# Patient Record
Sex: FEMALE | Race: WHITE | URBAN - METROPOLITAN AREA
[De-identification: names, ages, dates, MRNs, and addresses within clinical notes are randomized per-mention and may not be internally consistent; named-entity substitution may affect disease eponyms.]

---

## 2017-06-19 ENCOUNTER — HOSPITAL ENCOUNTER (OUTPATIENT)
Dept: MAMMOGRAPHY | Age: 67
Discharge: HOME | End: 2017-06-19
Attending: OBSTETRICS & GYNECOLOGY | Admitting: OBSTETRICS & GYNECOLOGY

## 2017-06-19 DIAGNOSIS — Z12.31 VISIT FOR SCREENING MAMMOGRAM: ICD-10-CM

## 2018-02-27 ENCOUNTER — OFFICE VISIT (OUTPATIENT)
Dept: DERMATOLOGY | Age: 68
End: 2018-02-27

## 2018-02-27 DIAGNOSIS — L82.1 SEBORRHEIC KERATOSES: ICD-10-CM

## 2018-02-27 DIAGNOSIS — D22.9 BENIGN NEVUS: Primary | ICD-10-CM

## 2018-02-27 DIAGNOSIS — L30.4 INTERTRIGO: ICD-10-CM

## 2018-02-27 DIAGNOSIS — L57.8 DIFFUSE PHOTODAMAGE OF SKIN: ICD-10-CM

## 2018-02-27 PROCEDURE — 99214 OFFICE O/P EST MOD 30 MIN: CPT | Performed by: DERMATOLOGY

## 2018-02-27 RX ORDER — NYSTATIN 100000 U/G
CREAM TOPICAL
Qty: 30 G | Refills: 3 | Status: SHIPPED | OUTPATIENT
Start: 2018-02-27 | End: 2022-02-15

## 2018-02-27 NOTE — PROGRESS NOTES
[facility]) Dermatology  Jamaica Dye M.D.  [phone]       Araseli Sanchez  1950    76 y.o. female     Date of Visit: 2/27/2018    Chief Complaint:   Chief Complaint   Patient presents with    Skin Exam     yearly exam last visit 4/5/16        I was asked to see this patient by Dr. Selby ref. provider found. History of Present Illness:  1. Total body skin exam    New problem: Recurrent rash beneath her breasts-developed in the summer when she is hot. Treats this with hydrocortisone. Has not had a prescription. Multiple seborrheic keratoses-slowly increasing in number. Asymptomatic. Predominantly located over her torso. Multiple nevi. Stable in size, shape, color. Asymptomatic. Patient has not noticed any new or changing pigmented lesions. History of actinic keratosis. No history of skin cancer    Review of Systems:  Constitutional: Reports general sense of well-being       Past Medical History, Surgical History, Family History, Medications and Allergies reviewed. Social History:   Social History     Social History    Marital status:      Spouse name: N/A    Number of children: N/A    Years of education: N/A     Occupational History    Not on file. Social History Main Topics    Smoking status: Never Smoker    Smokeless tobacco: Never Used    Alcohol use No    Drug use: Unknown    Sexual activity: Not on file     Other Topics Concern    Not on file     Social History Narrative    No narrative on file       Physical Examination       -General: Well-appearing, NAD  Normal affect. Total body skin exam including scalp, face, neck, chest, abdomen, back, bilateral upper extremities, bilateral lower extremities, ocular conjunctiva, external lips, and nails was performed. Underwear area not examined. Scattered on the trunk and extremities are multiple well-defined round and oval symmetric smoothly-bordered uniformly brown macules and papules.   Multiple

## 2020-09-08 ENCOUNTER — OFFICE VISIT (OUTPATIENT)
Dept: DERMATOLOGY | Age: 70
End: 2020-09-08
Payer: MEDICARE

## 2020-09-08 VITALS — TEMPERATURE: 97.5 F

## 2020-09-08 PROCEDURE — 99214 OFFICE O/P EST MOD 30 MIN: CPT | Performed by: DERMATOLOGY

## 2020-09-08 PROCEDURE — 17110 DESTRUCTION B9 LES UP TO 14: CPT | Performed by: DERMATOLOGY

## 2020-09-08 RX ORDER — TRIAMCINOLONE ACETONIDE 1 MG/G
CREAM TOPICAL
Qty: 80 G | Refills: 1 | Status: SHIPPED | OUTPATIENT
Start: 2020-09-08 | End: 2022-02-15

## 2020-09-08 NOTE — PROGRESS NOTES
[facility]) Dermatology  Tremaine Washington M.D.  [phone]       [facility]  1950    79 y.o. female     Date of Visit: 9/8/2020    Chief Complaint:   Chief Complaint   Patient presents with    Skin Lesion        I was asked to see this patient by Dr. Selby ref. provider found. History of Present Illness:  1. Total-body skin exam      New rash-bilateral thighs, bilateral axilla, left inguinal crease-not pruritic. Notes a textural change to her skin. Present for about the last 3 months. Not progressive. Developed rather suddenly. Raised papules upper forehead/scalp-not itching, bleeding. Asymptomatic. May be increasing in number    Raised papule right base of anterior neck-bothersome. Catching on clothing and becoming traumatized. Patient manipulating lesion. Multiple nevi. Stable in size, shape, color. No new or changing pigmented lesion    History of actinic keratosis. No history of skin cancer    Review of Systems:  Constitutional: Reports general sense of well-being   Skin-no lesions concerning for skin cancer    Past Medical History, Surgical History, Family History, Medications and Allergies reviewed.     Social History:   Social History     Socioeconomic History    Marital status:      Spouse name: Not on file    Number of children: Not on file    Years of education: Not on file    Highest education level: Not on file   Occupational History    Not on file   Social Needs    Financial resource strain: Not on file    Food insecurity     Worry: Not on file     Inability: Not on file    Transportation needs     Medical: Not on file     Non-medical: Not on file   Tobacco Use    Smoking status: Never Smoker    Smokeless tobacco: Never Used   Substance and Sexual Activity    Alcohol use: No    Drug use: Not on file    Sexual activity: Not on file   Lifestyle    Physical activity     Days per week: Not on file     Minutes per session: Not on file    Stress: Not on formation, discomfort, scar, hypopigmentation. Discussed wound care. 4. Rash-differential diagnosis includes nummular dermatitis, very early lichen sclerosis, doubt CTCL. Trial of triamcinolone 0.1% cream twice daily 2 weeks on followed by 2 weeks off-recheck 2 months. Patient will not use triamcinolone prior to that appointment. Biopsy if rash still present.   Patient may cancel if rash has resolved

## 2022-02-15 ENCOUNTER — OFFICE VISIT (OUTPATIENT)
Dept: DERMATOLOGY | Age: 72
End: 2022-02-15
Payer: MEDICARE

## 2022-02-15 DIAGNOSIS — D22.9 BENIGN NEVUS: ICD-10-CM

## 2022-02-15 DIAGNOSIS — L65.8 FEMALE PATTERN ALOPECIA: ICD-10-CM

## 2022-02-15 DIAGNOSIS — L91.8 INFLAMED SKIN TAG: ICD-10-CM

## 2022-02-15 DIAGNOSIS — L57.0 KERATOSIS, ACTINIC: ICD-10-CM

## 2022-02-15 DIAGNOSIS — R21 RASH: Primary | ICD-10-CM

## 2022-02-15 PROCEDURE — 99213 OFFICE O/P EST LOW 20 MIN: CPT | Performed by: DERMATOLOGY

## 2022-02-15 PROCEDURE — 17000 DESTRUCT PREMALG LESION: CPT | Performed by: DERMATOLOGY

## 2022-02-15 PROCEDURE — 11200 RMVL SKIN TAGS UP TO&INC 15: CPT | Performed by: DERMATOLOGY

## 2022-02-15 PROCEDURE — 11104 PUNCH BX SKIN SINGLE LESION: CPT | Performed by: DERMATOLOGY

## 2022-02-15 PROCEDURE — 17003 DESTRUCT PREMALG LES 2-14: CPT | Performed by: DERMATOLOGY

## 2022-02-15 RX ORDER — OMEPRAZOLE 40 MG/1
CAPSULE, DELAYED RELEASE ORAL
COMMUNITY
Start: 2021-12-07

## 2022-02-15 NOTE — PROGRESS NOTES
[facility]) Dermatology  Danielle Allred M.D.  [phone]       Ramona Goes  1950    67 y.o. female     Date of Visit: 2/15/2022    Chief Complaint:   Chief Complaint   Patient presents with    Skin Lesion        I was asked to see this patient by Dr. Selby ref. provider found. History of Present Illness:  1. Total-body skin exam    Patient with slightly erythematous plaques in her thighs, left superior breast/chest, right lateral back-noticed at her last visit and given triamcinolone cream without resolution. Rash had been present about 1.5 years. Asymptomatic-not itching. Not progressive. Not improving. We had discussed return visit if rash did not resolve, but patient noted that it was asymptomatic and did not return. Dry papules right cheek, forehead, left cheek-dry but not itching, bleeding. Asymptomatic. Irritated skin tag right anterior axilla-catching on her clothing and becoming traumatized. Has increased in size. Multiple nevi. Stable in size, shape, color. Has not noticed any new or changing pigmented lesions. Female pattern alopecia-progressive with diffuse thinning of her scalp, worse anterior and vertex of scalp. History of actinic keratosis.  No history of skin cancer    Review of Systems:  Constitutional: Reports general sense of well-being       Past Medical History, Surgical History, Family History, Medications and Allergies reviewed.     Social History:   Social History     Socioeconomic History    Marital status:      Spouse name: Not on file    Number of children: Not on file    Years of education: Not on file    Highest education level: Not on file   Occupational History    Not on file   Tobacco Use    Smoking status: Never Smoker    Smokeless tobacco: Never Used   Vaping Use    Vaping Use: Never used   Substance and Sexual Activity    Alcohol use: No    Drug use: Not on file    Sexual activity: Not on file   Other Topics Concern  Not on file   Social History Narrative    Not on file     Social Determinants of Health     Financial Resource Strain:     Difficulty of Paying Living Expenses: Not on file   Food Insecurity:     Worried About Running Out of Food in the Last Year: Not on file    Flory of Food in the Last Year: Not on file   Transportation Needs:     Lack of Transportation (Medical): Not on file    Lack of Transportation (Non-Medical): Not on file   Physical Activity:     Days of Exercise per Week: Not on file    Minutes of Exercise per Session: Not on file   Stress:     Feeling of Stress : Not on file   Social Connections:     Frequency of Communication with Friends and Family: Not on file    Frequency of Social Gatherings with Friends and Family: Not on file    Attends Buddhism Services: Not on file    Active Member of [address] [place] [address] [employer] or Organizations: Not on file    Attends Club or Organization Meetings: Not on file    Marital Status: Not on file   Intimate Partner Violence:     Fear of Current or Ex-Partner: Not on file    Emotionally Abused: Not on file    Physically Abused: Not on file    Sexually Abused: Not on file   Housing Stability:     Unable to Pay for Housing in the Last Year: Not on file    Number of [place] in the Last Year: Not on file    Unstable Housing in the Last Year: Not on file       Physical Examination       -General: Well-appearing, NAD  1. Normal affect. Total body skin exam including scalp, face, neck, chest, abdomen, back, bilateral upper extremities, bilateral lower extremities, ocular conjunctiva, external lips, and nails was performed. Examination normal unless stated below. Underwear area not examined. Scattered on the trunk and extremities are multiple well-defined round and oval symmetric smoothly-bordered uniformly brown macules and papules. Gritty erythematous papules forehead, left cheek, right cheek.   Multiple pedunculated flesh-colored papules bilateral axilla  Very subtle erythematous plaque left chest, medial thighs, right lateral back-no textural change or sclerosis      Assessment and Plan     1. Rash-differential diagnosis includes morphea, CTCL-recommended punch biopsy to rule out CTCL-representative area left chest chosen for biopsy-    -Discussed possible diagnosis. Patient agreeable to biopsy. Verbal consent obtained after risks (infection, bleeding, scar), benefits and alternatives explained. -Area(s) to be biopsied were marked with a surgical pen. Site(s) were cleansed with alcohol. Local anesthesia achieved with 1% lidocaine with epinephrine/sodium bicarbonate. 4mm punch biopsy was performed followed by placement of simple interrupted 4-0 nylon sutures. Specimen(s) sent to pathology. The wound(s) were dressed with petrolatum and covered with a bandage. Pt was educated re: risk of bleeding, infection, scar, wound care instructions and suture removal.   Patient will have her sutures removed in 2 weeks-plans to have a friend who is a nurse remove them. If she is unable to arrange this, return here for removal     2. Benign nevus - Benign acquired melanocytic nevi  -Recommend monthly self skin exams   -Educated regarding the ABCDEs of melanoma detection   -Call for any new/changing moles or concerning lesions  -Reviewed sun protective behavior -- sun avoidance during the peak hours of the day, sun-protective clothing (including hat and sunglasses), sunscreen use (water resistant, broad spectrum, SPF at least 30, need for reapplication every 2 to 3 hours), avoidance of tanning beds      3. Keratosis, actinic -6 forehead, temples, cheeks- lesion(s) treated w/ liquid nitrogen. Edu re: risk of blister formation, discomfort, scar, hypopigmentation. Discussed wound care. 4. Inflamed skin tag -right axilla-1 lesion(s) treated w/ liquid nitrogen. Edu re: risk of blister formation, discomfort, scar, hypopigmentation. Discussed wound care.       5. Female pattern alopecia-discussed treatment options-start minoxidil 5% solution nightly-discussed 2% versus 5% in treatment for at least 4 months before she evaluates improvement. Reviewed proper use.

## 2022-02-17 LAB — DERMATOLOGY PATHOLOGY REPORT: NORMAL

## 2024-11-12 ENCOUNTER — OFFICE VISIT (OUTPATIENT)
Dept: DERMATOLOGY | Age: 74
End: 2024-11-12
Payer: MEDICARE

## 2024-11-12 DIAGNOSIS — L65.8 FEMALE PATTERN ALOPECIA: Primary | ICD-10-CM

## 2024-11-12 DIAGNOSIS — L82.1 SEBORRHEIC KERATOSES: ICD-10-CM

## 2024-11-12 DIAGNOSIS — L94.0 MORPHEA: ICD-10-CM

## 2024-11-12 DIAGNOSIS — D22.9 BENIGN NEVUS: ICD-10-CM

## 2024-11-12 PROCEDURE — 1159F MED LIST DOCD IN RCRD: CPT | Performed by: DERMATOLOGY

## 2024-11-12 PROCEDURE — 1123F ACP DISCUSS/DSCN MKR DOCD: CPT | Performed by: DERMATOLOGY

## 2024-11-12 PROCEDURE — 99214 OFFICE O/P EST MOD 30 MIN: CPT | Performed by: DERMATOLOGY

## 2024-11-12 NOTE — PROGRESS NOTES
Transportation Needs: Not on file   Physical Activity: Not on file   Stress: Not on file   Social Connections: Not on file   Intimate Partner Violence: Not on file   Housing Stability: Not on file       Physical Examination       -General: Well-appearing, NAD  1. Normal affect.  Total body skin exam including scalp, face, neck, chest, abdomen, back, bilateral upper extremities, bilateral lower extremities, ocular conjunctiva, external lips, and nails was performed.  Examination normal unless stated below.  Underwear area not examined.  Scattered on the trunk and extremities are multiple well-defined round and oval symmetric smoothly-bordered uniformly brown macules and papules.  Erythematous plaques with raised margin posterior thighs consistent with prior history of inflammatory morphea.  Other previously involved areas have resolved.      Assessment and Plan     1. Female pattern alopecia-reviewed topical minoxidil-apply nightly.  Discussed 3 to 4 months of continuous use before evaluating improvement   2. Benign nevus - Benign acquired melanocytic nevi  -Recommend monthly self skin exams   -Educated regarding the ABCDEs of melanoma detection   -Call for any new/changing moles or concerning lesions  -Reviewed sun protective behavior -- sun avoidance during the peak hours of the day, sun-protective clothing (including hat and sunglasses), sunscreen use (water resistant, broad spectrum, SPF at least 30, need for reapplication every 2 to 3 hours), avoidance of tanning beds      3. Seborrheic keratoses -Discussed underlying nature of seborrheic keratosis and low risk of malignancy. Treatment reserved for lesions that are itching, bleeding, growing or otherwise becoming bothersome. Discussed monitoring for change with reevaluation for changing lesions.    4. Morphea-discussed with patient, asymptomatic and limited area of involvement, no therapy for now